# Patient Record
Sex: FEMALE | Race: WHITE | NOT HISPANIC OR LATINO | Employment: UNEMPLOYED | URBAN - METROPOLITAN AREA
[De-identification: names, ages, dates, MRNs, and addresses within clinical notes are randomized per-mention and may not be internally consistent; named-entity substitution may affect disease eponyms.]

---

## 2017-01-27 DIAGNOSIS — Q25.72 PULMONARY ARTERIOVENOUS MALFORMATION: Primary | ICD-10-CM

## 2017-01-27 DIAGNOSIS — I78.0 HHT (HEREDITARY HEMORRHAGIC TELANGIECTASIA) (H): ICD-10-CM

## 2017-04-17 ENCOUNTER — TELEPHONE (OUTPATIENT)
Dept: INTERVENTIONAL RADIOLOGY/VASCULAR | Facility: CLINIC | Age: 22
End: 2017-04-17

## 2017-04-25 NOTE — TELEPHONE ENCOUNTER
Spoke with Nazanin Pena's daughter.  Dr. Roberson has reviewed the imaging and there are no pulmonary AVM's present.  The patient will need to return in 5 years for a repeat CT of the chest without contrast.    Monisha Kimball, CNS  Interventional Radiology

## 2017-09-03 ENCOUNTER — HEALTH MAINTENANCE LETTER (OUTPATIENT)
Age: 22
End: 2017-09-03

## 2019-11-07 ENCOUNTER — HEALTH MAINTENANCE LETTER (OUTPATIENT)
Age: 24
End: 2019-11-07

## 2020-02-23 ENCOUNTER — HEALTH MAINTENANCE LETTER (OUTPATIENT)
Age: 25
End: 2020-02-23

## 2020-12-06 ENCOUNTER — HEALTH MAINTENANCE LETTER (OUTPATIENT)
Age: 25
End: 2020-12-06

## 2021-09-25 ENCOUNTER — HEALTH MAINTENANCE LETTER (OUTPATIENT)
Age: 26
End: 2021-09-25

## 2022-01-15 ENCOUNTER — HEALTH MAINTENANCE LETTER (OUTPATIENT)
Age: 27
End: 2022-01-15

## 2022-01-20 ENCOUNTER — MEDICAL CORRESPONDENCE (OUTPATIENT)
Dept: HEALTH INFORMATION MANAGEMENT | Facility: CLINIC | Age: 27
End: 2022-01-20
Payer: COMMERCIAL

## 2022-01-21 ENCOUNTER — TELEPHONE (OUTPATIENT)
Dept: RADIOLOGY | Facility: CLINIC | Age: 27
End: 2022-01-21
Payer: COMMERCIAL

## 2022-01-21 DIAGNOSIS — I78.0 OSLER HEMORRHAGIC TELANGIECTASIA SYNDROME (H): ICD-10-CM

## 2022-01-21 DIAGNOSIS — Q25.72 PULMONARY ARTERIOVENOUS MALFORMATION: Primary | ICD-10-CM

## 2022-01-21 NOTE — TELEPHONE ENCOUNTER
I got a call from [facility].  She is calling to see Dr Walters for pulm avm 5 yr follow up.  I will get her needed appointments.  She is doing well.  No concerns, and last saw Dr Roberson.  She normally is seen at [facility] and will fax me a provider order from  to be seen by MARI Stauffre, RN, BSN  Interventional Radiology Nurse Coordinator   Phone:  [phone]

## 2022-02-01 NOTE — TELEPHONE ENCOUNTER
DIAGNOSIS: please send video link to [email or web address]  pulm avm 5 yr follow up   DATE RECEIVED: 2.16.21   NOTES STATUS DETAILS   OFFICE NOTE from referring provider na    OFFICE NOTE from other specialist na    DISCHARGE SUMMARY from hospital na    DISCHARGE REPORT from the ER na    OPERATIVE REPORT na    MEDICATION LIST CE    XRAYS (IMAGES & REPORTS) Internal *sched* for 2.9.22  CTA Chest    3.24.17  CT Chest   PATHOLOGY  Slides & report na

## 2022-02-09 ENCOUNTER — HOSPITAL ENCOUNTER (OUTPATIENT)
Dept: CT IMAGING | Facility: CLINIC | Age: 27
Discharge: HOME | End: 2022-02-09
Attending: RADIOLOGY | Admitting: RADIOLOGY
Payer: COMMERCIAL

## 2022-02-09 DIAGNOSIS — I78.0 OSLER HEMORRHAGIC TELANGIECTASIA SYNDROME (H): ICD-10-CM

## 2022-02-09 DIAGNOSIS — Q25.72 PULMONARY ARTERIOVENOUS MALFORMATION: ICD-10-CM

## 2022-02-09 PROBLEM — F34.1 DYSTHYMIA: Status: ACTIVE | Noted: 2018-02-01

## 2022-02-09 PROCEDURE — 250N000011 HC RX IP 250 OP 636: Performed by: RADIOLOGY

## 2022-02-09 PROCEDURE — 71275 CT ANGIOGRAPHY CHEST: CPT

## 2022-02-09 PROCEDURE — 250N000009 HC RX 250: Performed by: RADIOLOGY

## 2022-02-09 RX ORDER — IOPAMIDOL 755 MG/ML
80 INJECTION, SOLUTION INTRAVASCULAR ONCE
Status: COMPLETED | OUTPATIENT
Start: 2022-02-09 | End: 2022-02-09

## 2022-02-09 RX ADMIN — IOPAMIDOL 80 ML: 755 INJECTION, SOLUTION INTRAVENOUS at 16:03

## 2022-02-09 RX ADMIN — SODIUM CHLORIDE 80 ML: 9 INJECTION, SOLUTION INTRAVENOUS at 16:03

## 2022-02-16 ENCOUNTER — PRE VISIT (OUTPATIENT)
Dept: RADIOLOGY | Facility: CLINIC | Age: 27
End: 2022-02-16
Payer: COMMERCIAL

## 2022-03-09 ENCOUNTER — VIRTUAL VISIT (OUTPATIENT)
Dept: RADIOLOGY | Facility: CLINIC | Age: 27
End: 2022-03-09
Attending: RADIOLOGY
Payer: COMMERCIAL

## 2022-03-09 DIAGNOSIS — I78.0 HHT (HEREDITARY HEMORRHAGIC TELANGIECTASIA) (H): Primary | ICD-10-CM

## 2022-03-09 PROCEDURE — 99203 OFFICE O/P NEW LOW 30 MIN: CPT | Mod: 95 | Performed by: RADIOLOGY

## 2022-03-09 RX ORDER — AMOXICILLIN 500 MG/1
2000 CAPSULE ORAL ONCE
Qty: 4 CAPSULE | Refills: 0 | Status: SHIPPED | OUTPATIENT
Start: 2022-03-09 | End: 2022-03-09

## 2022-03-09 NOTE — PATIENT INSTRUCTIONS
Nazanin you have had your virtual follow up today with Dr Walters regarding HHT/Pulmonary avm.  Your CTA chest completed on 2/9/22 has been reviewed with you during this visit.    Plan:    Return in 5 years for follow up with same CTA chest.  We will contact you closer to the date to schedule.    Prescription for Amoxicillin has been sent you your [facility] pharmacy as discussed.    We will obtain the MRI brain completed at [facility] in [place] for Dr Walters to review.    Please don't hesitate to call if you have questions or concerns,     MARI Sanchez, RN, BSN  Interventional Radiology Nurse Coordinator   Phone:  [phone]

## 2022-03-09 NOTE — PROGRESS NOTES
Video-Visit Details    Type of service:  Video Visit    Video Start Time: 0907    Video End Time: 0918    Originating Location (pt. 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 Roberson.     Patient was first diagnosed with HHT after multiple family members were diagnosed with HHT including her maternal grandfather and mother.  Mom notably has significant pulmonary manifestations, and has required multiple pulmonary AVM embolizations.    Fortunately, Ms. Waller denies dyspnea or TIA symptoms.  With her prior AVM that was embolized by Dr. Roberson in 2006, she notes that she did not recall having dyspnea or TIA symptoms at that time.  No recent bubble study.  She tolerates activity without dyspnea.    She does report ongoing headaches, she relates to stress, acceptably managed. She notes small volume epistaxis, 1-2 times per week, worse in the dry winter months. No large volume bleeds.    She denies upper or GI bleeding, anemia, or needing iron supplement.      She had prior brain MRA in the past she states was negative for brain AVM.  The study was ordered by a provider in [facility], and the MR was performed at [facility] in [place].  We will try to obtain those images.     Denies endoscopic evaluation in the past. No reported hematochezia or black stools.     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    Review of Systems:    General: Negative for recent fever.  Respiratory: Negative for shortness of breath.  Cardiovascular: Negative for chest  pain.  Gastrointestinal: Negative for abdominal pain or swelling, nausea, vomiting, or diarrhea.  Musculoskeletal: Negative for ankle swelling.    Past Medical/Surgical History:    No past medical history on file.  No past surgical history on file.    Current Medications:    Current Outpatient Medications   Medication Sig Dispense Refill     escitalopram (LEXAPRO) 10 MG tablet        fexofenadine (ALLEGRA) 180 MG tablet Take 180 mg by mouth       fluticasone (FLONASE) 50 MCG/ACT nasal spray        fluticasone-salmeterol (ADVAIR-HFA) 115-21 MCG/ACT inhaler        levonorgestrel (MIRENA) 20 MCG/24HR IUD 1 Intra Uterine Device by Intrauterine route       melatonin 3 MG tablet        montelukast (SINGULAIR) 10 MG tablet        Multiple Vitamins-Minerals (WOMENS MULTIVITAMIN) TABS        Vitamin D3 (CHOLECALCIFEROL) 25 mcg (1000 units) tablet            Allergies:    Patient has no allergy information on record.    Family History:    No family history on file.    Social History:    Social History     Socioeconomic History     Marital status: Single     Spouse name: Not on file     Number of children: Not on file     Years of education: Not on file     Highest education level: Not on file   Occupational History     Not on file   Tobacco Use     Smoking status: Not on file     Smokeless tobacco: Not on file   Substance and Sexual Activity     Alcohol use: Not on file     Drug use: Not on file     Sexual activity: Not on file   Other Topics Concern     Not on file   Social History Narrative     Not on file     Social Determinants of Health     Financial Resource Strain: Not on file   Food Insecurity: Not on file   Transportation Needs: Not on file   Physical Activity: Not on file   Stress: Not on file   Social Connections: Not on file   Intimate Partner Violence: Not on file   Housing Stability: Not on file       Physical Exam:    There were no vitals taken for this visit.     GENERAL APPEARANCE: healthy, alert and no  distress  PSYCHIATRIC: mentation appears normal and affect normal.  RESP: breathing in room air. No shortness of breath.   MUSCULOSKELETAL: Moving upper extremities.      Laboratory Studies:  None.       Imaging:     I reviewed the CTA 2/9/2022. Prior right middle lobe AVM coil embolization. No evidence of recannulization or new pulmonary AVM.     ASSESSMENT:      Nazanin Bond is a 26 year old female with pulmonary AVM s/p coil embolization 12/12/2006 by Dr. Roberson. No evidence of residual or new pulmonary AVM on the CTA. Reports manageable non-lifestyle limiting headache and epistaxis.       PLAN:  -Follow up in 5 years with repeat CTA chest.   - Obtain outside brain MRI to confirm it was adequate for assessment for brain AVM. We will have our neuroradiology colleagues formally review the study.  - Prescribe prophylactic amoxicillin 2g to be administrated 60 minutes before the upcoming dental procedure.     Jessica Higgins M.D. (/IR PGY-6).  Department of Interventional Radiology  [facility]     Is present for the entire video visit and agree with the assessment and plan documented by Dr. Higgins.    I spent a total of 11 minutes face-to-face time on today's video visit, over 50% time was for counseling and care coordination.  In addition I spent 10 minutes completing documentation and 10 minutes reviewing imaging.    Renay Walters MD  Interventional Radiology   Pager [phone]    CC  Patient Care Team:  Rajesh Metcalf as PCP - General  SELF, REFERRED

## 2022-03-09 NOTE — PROGRESS NOTES
Nazanin is a 26 year old who is being evaluated via a billable video visit.      How would you like to obtain your AVS? MyChart  If the video visit is dropped, the invitation should be resent by: Text to cell phone: [phone]  Will anyone else be joining your video visit? Cristina YATES

## 2022-03-09 NOTE — LETTER
3/9/2022     RE: Nazanin Bond  [address]  [place] [address]    Dear Colleague,    Thank you for referring your patient, Nazanin Bond, to the [facility]. Please see a copy of my visit note below.      Video-Visit Details    Type of service:  Video Visit    Video Start Time: 0907    Video End Time: 0918    Originating Location (pt. 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 Roberson.     Patient was first diagnosed with HHT after multiple family members were diagnosed with HHT including her maternal grandfather and mother.  Mom notably has significant pulmonary manifestations, and has required multiple pulmonary AVM embolizations.    Fortunately, Ms. Waller denies dyspnea or TIA symptoms.  With her prior AVM that was embolized by Dr. Roberson in 2006, she notes that she did not recall having dyspnea or TIA symptoms at that time.  No recent bubble study.  She tolerates activity without dyspnea.    She does report ongoing headaches, she relates to stress, acceptably managed. She notes small volume epistaxis, 1-2 times per week, worse in the dry winter months. No large volume bleeds.    She denies upper or GI bleeding, anemia, or needing iron supplement.      She had prior brain MRA in the past she states was negative for brain AVM.  The study was ordered by a provider in [facility], and the MR was performed at [facility] in [place].  We will try to obtain those images.     Denies endoscopic evaluation in the past. No reported hematochezia or black stools.     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    Review of Systems:    General: Negative for recent fever.  Respiratory: Negative for shortness of breath.  Cardiovascular: Negative for chest pain.  Gastrointestinal: Negative for abdominal pain or swelling, nausea, vomiting, or diarrhea.  Musculoskeletal: Negative for ankle swelling.    Past Medical/Surgical History:    No past medical history on file.  No past surgical history on file.    Current Medications:    Current Outpatient Medications   Medication Sig Dispense Refill     escitalopram (LEXAPRO) 10 MG tablet        fexofenadine (ALLEGRA) 180 MG tablet Take 180 mg by mouth       fluticasone (FLONASE) 50 MCG/ACT nasal spray        fluticasone-salmeterol (ADVAIR-HFA) 115-21 MCG/ACT inhaler        levonorgestrel (MIRENA) 20 MCG/24HR IUD 1 Intra Uterine Device by Intrauterine route       melatonin 3 MG tablet        montelukast (SINGULAIR) 10 MG tablet        Multiple Vitamins-Minerals (WOMENS MULTIVITAMIN) TABS        Vitamin D3 (CHOLECALCIFEROL) 25 mcg (1000 units) tablet            Allergies:    Patient has no allergy information on record.    Family History:    No family history on file.    Social History:    Social History     Socioeconomic History     Marital status: Single     Spouse name: Not on file     Number of children: Not on file     Years of education: Not on file     Highest education level: Not on file   Occupational History     Not on file   Tobacco Use     Smoking status: Not on file     Smokeless tobacco: Not on file   Substance and Sexual Activity     Alcohol use: Not on file     Drug use: Not on file     Sexual activity: Not on file   Other Topics Concern     Not on file   Social History Narrative     Not on file     Social Determinants of Health     Financial Resource Strain: Not on file   Food Insecurity: Not on file   Transportation Needs: Not on file   Physical Activity: Not on file    Stress: Not on file   Social Connections: Not on file   Intimate Partner Violence: Not on file   Housing Stability: Not on file       Physical Exam:    There were no vitals taken for this visit.     GENERAL APPEARANCE: healthy, alert and no distress  PSYCHIATRIC: mentation appears normal and affect normal.  RESP: breathing in room air. No shortness of breath.   MUSCULOSKELETAL: Moving upper extremities.      Laboratory Studies:  None.       Imaging:     I reviewed the CTA 2/9/2022. Prior right middle lobe AVM coil embolization. No evidence of recannulization or new pulmonary AVM.     ASSESSMENT:      Nazanin Bond is a 26 year old female with pulmonary AVM s/p coil embolization 12/12/2006 by Dr. Roberson. No evidence of residual or new pulmonary AVM on the CTA. Reports manageable non-lifestyle limiting headache and epistaxis.       PLAN:  -Follow up in 5 years with repeat CTA chest.   - Obtain outside brain MRI to confirm it was adequate for assessment for brain AVM. We will have our neuroradiology colleagues formally review the study.  - Prescribe prophylactic amoxicillin 2g to be administrated 60 minutes before the upcoming dental procedure.     Jessica Higgins M.D. (/IR PGY-6).  Department of Interventional Radiology  [facility]     Is present for the entire video visit and agree with the assessment and plan documented by Dr. Higgins.    I spent a total of 11 minutes face-to-face time on today's video visit, over 50% time was for counseling and care coordination.  In addition I spent 10 minutes completing documentation and 10 minutes reviewing imaging.    Renay Walters MD  Interventional Radiology   Pager [phone]    CC  Patient Care Team:  Rajesh Metcalf as PCP - General  SELF, REFERRED    Nazanin is a 26 year old who is being evaluated via a billable video visit.      How would you like to obtain your AVS? MyChart  If the video visit is dropped, the invitation should be resent by: Text to  cell phone: [phone]  Will anyone else be joining your video visit? No  Kyleigh YATES

## 2022-03-19 ENCOUNTER — VIRTUAL VISIT (OUTPATIENT)
Dept: URGENT CARE | Facility: CLINIC | Age: 27
End: 2022-03-19
Payer: COMMERCIAL

## 2022-03-19 DIAGNOSIS — U07.1 INFECTION DUE TO 2019 NOVEL CORONAVIRUS: Primary | ICD-10-CM

## 2022-03-19 PROCEDURE — 99204 OFFICE O/P NEW MOD 45 MIN: CPT | Mod: 95

## 2022-03-19 RX ORDER — ALBUTEROL SULFATE 90 UG/1
AEROSOL, METERED RESPIRATORY (INHALATION)
COMMUNITY
Start: 2021-08-11

## 2022-03-19 NOTE — PROGRESS NOTES
Assessment:     COVID-19 positive patient.  Encounter for consideration of medication intervention.    Patient does qualify for a prescription.  She has known pulmonary AVM, asthma, dysthmia.   Full discussion with patient including medication options, risks and benefits.   Potential drug interactions reviewed with patient.      Plan:  Treatment planned -  Paxlovid, Rx sent to [facility]  [facility]   [phone]  [address] [place] [address]    Hours:  Mon-Fri: 8:30a - 6:00p  Sat-Sun: 8:30a - 12:30p    Curbside Delivery: Patient to call [phone] to set up and  at door 2 of [facility]    Temporary change to home medications:   None, caution re: decreased effectiveness of hormonal contraception, use back up.  Also no indication to discontinue fluticisone, low risk nasal steroid.      See patient instructions    5    Virtual Urgent Care      Patient preference to obtain AVS:  Robert Back  is a 26 year old  who has a confirmed new positive COVID-19 diagnosis.    She has been identified as high risk for complications of this infection, and is being evaluated via a billable Video visit:     Video-Visit Details    Type of service:  Video Visit    Video Visit Start Time:1100    Video End Time:1115    Originating Location (pt. Location): Home    Distant Location (provider location):  [facility]     Platform used for Video Visit: [employer]    Concern for COVID-19  Exactly how many days ago did these symptoms start? 3-18-22     Are any of the following symptoms significant for you?  New or worsening difficulty breathing? Yes    Please describe what kind of difficulty you are having breathing:No dyspnea, or dyspnea at patients normal baseline, using inhaler few times per day.      Worsening cough? Yes, it's a dry cough.     Fever or chills? No    Headache: YES    Sore throat: YES    Chest pain: no    Diarrhea: no    Body aches? YES    What  treatments has patient tried? Nonsteroidals , albuterol, nighttime cold medication    Does patient live in a nursing home, group home, or shelter? no  Does patient have a way to get food/medications during quarantined? Yes, I have a friend or family member who can help me.           MASSBP Score 3/18/2022   Age Greater than or equal to 65 years 0   BMI greater than or equal to 35 kg/m2 0   Has Diabetes Mellitus 0   Has Chronic Kidney Disease 0   Has Cardiovascular Disease and 55 years or older 0   Has Chronic Respiratory Disease and 55 years or older 0   Has Hypertension and 55 years or older 0   Is Immunocompromised 0   Is Pregnant 0   Member of [employer] community (Black/, /, ,  or , or  or [place] Native)  0   MASSBP Score 0   What day did symptoms start?  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      Objective    Vitals:  No vitals were obtained today due to virtual visit.  There is no height or weight on file to calculate BMI.   GENERAL: Healthy, alert and no distress  EYES: Eyes grossly normal to inspection.  No discharge or erythema, or obvious scleral/conjunctival abnormalities.  RESP: No audible wheeze, cough, or visible cyanosis.  No visible retractions or increased work of breathing.    SKIN: Visible skin clear. No significant rash, abnormal pigmentation or lesions.  NEURO: Cranial nerves grossly intact.  Mentation and speech appropriate for age.  PSYCH: Mentation appears normal, affect normal/bright, judgement and insight intact, normal speech and appearance well-groomed.  Reported normal renal and liver function.          [id number]}

## 2022-03-19 NOTE — PATIENT INSTRUCTIONS
"  Discharge Instructions for COVID-19 Patients  You have--or may have--COVID-19. Please follow the instructions listed below.   If you have a weakened immune system, discuss with your doctor any other actions you need to take.  How can I protect others?  If you have symptoms (fever, cough, body aches or trouble breathing):    Stay home and away from others (self-isolate) until:  ? Your other symptoms have resolved (gotten better). And   ? You've had no fever--and no medicine that reduces fever--for 1 full day (24 hours). And   ? At least 10 days have passed since your symptoms started. (You may need to wait 20 days. Follow the advice of your care team.)  If you don't show symptoms, but testing showed that you have COVID-19:    Stay home and away from others (self-isolate) until at least 10 days have passed since the date of your first positive COVID-19 test.  During this time    Stay in your own room, even for meals. Use your own bathroom if you can.    Stay away from others in your home. No hugging, kissing or shaking hands. No visitors.    Don't go to work, school or anywhere else.    Clean \"high touch\" surfaces often (doorknobs, counters, handles). Use household cleaning spray or wipes.    You'll find a full list of  on the EPA website: www.epa.gov/pesticide-registration/list-n-disinfectants-use-against-sars-cov-2.    Cover your mouth and nose with a mask or other face covering to avoid spreading germs.    Wash your hands and face often. Use soap and water.    Caregivers in these groups are at risk for severe illness due to COVID-19:  ? People 65 years and older  ? People who live in a nursing home or long-term care facility  ? People with chronic disease (lung, heart, cancer, diabetes, kidney, liver, immunologic)  ? People who have a weakened immune system, including those who:    Are in cancer treatment    Take medicine that weakens the immune system, such as corticosteroids    Had a bone marrow or " organ transplant    Have an immune deficiency    Have poorly controlled HIV or AIDS    Are obese (body mass index of 40 or higher)    Smoke regularly    Caregivers should wear gloves while washing dishes, handling laundry and cleaning bedrooms and bathrooms.    Use caution when washing and drying laundry: Don't shake dirty laundry and use the warmest water setting that you can.    For more tips on managing your health at home, go to www.cdc.gov/coronavirus/2019-ncov/downloads/10Things.pdf.  How can I take care of myself at home?  1. Get lots of rest. Drink extra fluids (unless a doctor has told you not to).  2. Take Tylenol (acetaminophen) for fever or pain. If you have liver or kidney problems, ask your family doctor if it's okay to take Tylenol.   Adults can take either:   ? 650 mg (two 325 mg pills) every 4 to 6 hours, or   ? 1,000 mg (two 500 mg pills) every 8 hours as needed.  ? Note: Don't take more than 3,000 mg in one day. Acetaminophen is found in many medicines (both prescribed and over-the-counter medicines). Read all labels to be sure you don't take too much.   For children, check the Tylenol bottle for the right dose. The dose is based on the child's age or weight.  3. If you have other health problems (like cancer, heart failure, an organ transplant or severe kidney disease): Call your specialty clinic if you don't feel better in the next 2 days.  4. Know when to call 911. Emergency warning signs include:  ? Trouble breathing or shortness of breath  ? Pain or pressure in the chest that doesn't go away  ? Feeling confused like you haven't felt before, or not being able to wake up  ? Bluish-colored lips or face  5. Your doctor may have prescribed a blood thinner medicine. Follow their instructions.  Where can I get more information?     [employer] - About COVID-19:   [email or web address]    CDC - What to Do If You're Sick:  www.cdc.gov/coronavirus/2019-ncov/about/steps-when-sick.html    CDC - Ending Home Isolation: www.cdc.gov/coronavirus/2019-ncov/hcp/disposition-in-home-patients.html    CDC - Caring for Someone: www.cdc.gov/coronavirus/2019-ncov/if-you-are-sick/care-for-someone.html    [facility] - Interim Guidance for Hospital Discharge to Home: www.health.[facility].[place]./diseases/coronavirus/hcp/hospdischarge.pdf    Below are the COVID-19 hotlines at the [place] Department of Health ([facility]). Interpreters are available.  ? For health questions: Call 828-164-4469 or 1-924.569.2005 (7 a.m. to 7 p.m.)  ? For questions about schools and childcare: Call 644-318-0608 or 1-843.327.6755 (7 a.m. to 7 p.m.)    For informational purposes only. Not to replace the advice of your health care provider. Clinically reviewed by Dr. Kodak Mendez.   Copyright   2020 [facility]. All rights reserved. [employer] 427557 - REV 01/05/21.

## 2022-12-26 ENCOUNTER — HEALTH MAINTENANCE LETTER (OUTPATIENT)
Age: 27
End: 2022-12-26

## 2023-04-22 ENCOUNTER — HEALTH MAINTENANCE LETTER (OUTPATIENT)
Age: 28
End: 2023-04-22

## 2024-06-23 ENCOUNTER — HEALTH MAINTENANCE LETTER (OUTPATIENT)
Age: 29
End: 2024-06-23

## 2024-09-20 ENCOUNTER — MYC MEDICAL ADVICE (OUTPATIENT)
Dept: DERMATOLOGY | Facility: CLINIC | Age: 29
End: 2024-09-20

## 2024-09-23 NOTE — TELEPHONE ENCOUNTER
Dr. Walters would recommend that pt ask her OB provider what they would like done. She would not be due for a CTA until 2027 per Dr. Walters. If her OB provider would like one done sooner, she'd be happy to order and review. I called and spoke with pt and gave her this information. She verbalized understanding and will follow up with me if a CTA is requested by OB provider. She has my office number.     Thanks!    Chastity Bhatt RN  Nurse Care Coordinator-Interventional Radiology  Dr. Walters and Dr. Garcia  [phone]